# Patient Record
Sex: MALE | Race: WHITE | NOT HISPANIC OR LATINO | URBAN - METROPOLITAN AREA
[De-identification: names, ages, dates, MRNs, and addresses within clinical notes are randomized per-mention and may not be internally consistent; named-entity substitution may affect disease eponyms.]

---

## 2020-05-18 ENCOUNTER — EMERGENCY (EMERGENCY)
Facility: HOSPITAL | Age: 25
LOS: 1 days | Discharge: HOME | End: 2020-05-18
Attending: EMERGENCY MEDICINE | Admitting: EMERGENCY MEDICINE
Payer: COMMERCIAL

## 2020-05-18 VITALS
WEIGHT: 145.06 LBS | DIASTOLIC BLOOD PRESSURE: 83 MMHG | TEMPERATURE: 98 F | RESPIRATION RATE: 18 BRPM | HEART RATE: 79 BPM | OXYGEN SATURATION: 97 % | SYSTOLIC BLOOD PRESSURE: 148 MMHG

## 2020-05-18 PROCEDURE — 99282 EMERGENCY DEPT VISIT SF MDM: CPT

## 2020-05-18 NOTE — ED PROVIDER NOTE - NSFOLLOWUPINSTRUCTIONS_ED_ALL_ED_FT
Can take tylenol 650mg every 6hrs as needed for pain.  Can also take motrin 600mg every 6hrs as needed for pain (take with food, use may cause stomach issues).  Follow up with orthopedist. Can call [phone] to schedule appointment.   Return for fevers, persistent vomit, uncontrolled pain, focal weakness/numbness, worsening swelling, spreading redness.    Joint Pain    Joint pain (arthralgia) may be caused by many things. Joint pain is likely to go away when you follow instructions from your health care provider for relieving pain at home. However, joint pain can also be caused by conditions that require more treatment. Common causes of joint pain include:  Bruising in the area of the joint.  Injury caused by repeating certain movements too many times (overuse injury).  Age-related joint wear and tear (osteoarthritis).  Buildup of uric acid crystals in the joint (gout).  Inflammation of the joint (rheumatic disease).  Various other forms of arthritis.  Infections of the joint (septic arthritis) or of the bone (osteomyelitis).    Your health care provider may recommend that you take pain medicine or wear a supportive device like an elastic bandage, sling, or splint. If your joint pain continues, you may need lab or imaging tests to diagnose the cause of your joint pain.    Follow these instructions at home:  Managing pain, stiffness, and swelling   If directed, put ice on the painful area. Icing can help to relieve joint pain and swelling.  Put ice in a plastic bag.  Place a towel between your skin and the bag.  Leave the ice on for 20 minutes, 2–3 times a day.  If directed, apply heat to the painful area as often as told by your health care provider. Heat can reduce the stiffness of your muscles and joints. Use the heat source that your health care provider recommends, such as a moist heat pack or a heating pad.  Place a towel between your skin and the heat source.  Leave the heat on for 20–30 minutes.  Remove the heat if your skin turns bright red. This is especially important if you are unable to feel pain, heat, or cold. You may have a greater risk of getting burned.  Move your fingers or toes below the painful joint often. You can avoid stiffness and lessen swelling by doing this.  If possible, raise (elevate) the painful joint above the level of your heart while you are sitting or lying down. To do this, try putting a few pillows under the painful joint.    Activity   Rest the painful joint for as long as directed. Do not do anything that causes or worsens pain.  Begin exercising or stretching the affected area, as told by your health care provider. Ask your health care provider what types of exercise are safe for you.    If you have an elastic bandage, sling, or splint:   Wear the supportive device as told by your health care provider. Remove it only as told by your health care provider.  Loosen the device if your fingers or toes below the joint tingle, become numb, or turn cold and blue.  Keep the device clean.   Ask your health care provider if you should remove the device before bathing. You may need to cover it with a watertight covering when you take a bath or a shower.    General instructions   Take over-the-counter and prescription medicines only as told by your health care provider.  Do not use any products that contain nicotine or tobacco, such as cigarettes and e-cigarettes. If you need help quitting, ask your health care provider.  Keep all follow-up visits as told by your health care provider. This is important.  Contact a health care provider if:  You have pain that gets worse and does not get better with medicine.  Your joint pain does not improve within 3 days.  You have increased bruising or swelling.  You have a fever.  You lose 10 lb (4.5 kg) or more without trying.    Get help right away if:  You cannot move the joint.  Your fingers or toes tingle, become numb, or turn cold and blue.  You have a fever along with a joint that is red, warm, and swollen.    Summary  Joint pain (arthralgia) may be caused by many things.  Your health care provider may recommend that you take pain medicine or wear a supportive device like an elastic bandage, sling, or splint.  If your joint pain continues, you may need tests to diagnose the cause of your joint pain.  Take over-the-counter and prescription medicines only as told by your health care provider.

## 2020-05-18 NOTE — ED ADULT TRIAGE NOTE - CHIEF COMPLAINT QUOTE
c/o lateral right foot pain that started 1 week ago while he was walking around.  Pt states he had old injury to the foot.  Denies falls / recent trauma, open wounds, head injury, covid symptoms, known exposure to positive covid cases.  Mask applied pta

## 2020-05-18 NOTE — ED PROVIDER NOTE - PHYSICAL EXAMINATION
no LE edema, normal equal distal pulses, steady unassisted gait.   FROM all joints, no bony ttp.   No crepitus, firmness, induration, fluctuance. Skin is normal temp. No erythema/warmth. No obvious skin breaks.

## 2020-05-18 NOTE — ED PROVIDER NOTE - OBJECTIVE STATEMENT
24M no PMH p/w R lateral foot pain, began around 1w ago, gradual onset at the end of a long walk. Since then has pain to that region, only w/ bearing weight, not at rest. Came to Ed today bec not going away. No other trauma. Has hx of "broken metatarsal" many yrs ago, but unsure to which foot. Denies other joint pain, rashes/redness/welling, focal weakness/numbness, f/c. Not taking any pain medicine.

## 2020-05-18 NOTE — ED ADULT NURSE NOTE - CHPI ED NUR SYMPTOMS NEG
no deformity/no tingling/no fever/no back pain/no bruising/no numbness/no weakness/no stiffness/no abrasion

## 2020-05-18 NOTE — ED PROVIDER NOTE - CLINICAL SUMMARY MEDICAL DECISION MAKING FREE TEXT BOX
24M no PMH p/w R lateral foot pain, began around 1w ago, gradual onset at the end of a long walk. Since then has pain to that region, only w/ bearing weight, not at rest. Came to Ed today bec not going away. No other trauma. Has hx of "broken metatarsal" many yrs ago, but unsure to which foot. No other systemic symptoms. Vitals wnl, exam as above.  ddx: Likely from overuse. clinically benign.  Clinically no indication for further emergent ED workup or hospitalization at this time. Comfortable for dc, outpt f/u. 24M no PMH p/w R lateral foot pain, began around 1w ago, gradual onset at the end of a long walk. Since then has pain to that region, only w/ bearing weight, not at rest. Came to Ed today bec not going away. No other trauma. Has hx of "broken metatarsal" many yrs ago, but unsure to which foot. No other systemic symptoms. Vitals wnl, exam as above.  ddx: Likely from overuse. clinically benign.  Clinically no indication for further emergent ED workup or hospitalization at this time. Comfortable for dc, outpt f/u.  (clinically, no indication for emergent XR. Offered for pt comfort, pt prefers outpt f/u).

## 2020-05-18 NOTE — ED PROVIDER NOTE - PATIENT PORTAL LINK FT
You can access the FollowMyHealth Patient Portal offered by [facility] by registering at the following website: [email or web address]. By joining [employer]’s FollowMyHealth portal, you will also be able to view your health information using other applications (apps) compatible with our system.

## 2020-05-22 DIAGNOSIS — M79.671 PAIN IN RIGHT FOOT: ICD-10-CM

## 2024-11-25 NOTE — ED ADULT NURSE NOTE - NS ED PATIENT SAFETY CONCERN
DHEA Sulfate, Estradiol, Follicle Stimulating Hormone, Luteinizing Hormone, Thyroid Stimulating Hormone Reflex, Prolactin, and Glycohemoglobin labs done today in office. RT arm. PT tolerated well.        No